# Patient Record
Sex: FEMALE | Race: ASIAN | NOT HISPANIC OR LATINO
[De-identification: names, ages, dates, MRNs, and addresses within clinical notes are randomized per-mention and may not be internally consistent; named-entity substitution may affect disease eponyms.]

---

## 2018-01-19 ENCOUNTER — RESULT REVIEW (OUTPATIENT)
Age: 33
End: 2018-01-19

## 2018-01-19 ENCOUNTER — OUTPATIENT (OUTPATIENT)
Dept: OUTPATIENT SERVICES | Facility: HOSPITAL | Age: 33
LOS: 1 days | End: 2018-01-19
Payer: COMMERCIAL

## 2018-01-19 DIAGNOSIS — K61.0 ANAL ABSCESS: ICD-10-CM

## 2018-01-19 DIAGNOSIS — K60.3 ANAL FISTULA: ICD-10-CM

## 2018-01-19 PROCEDURE — 88305 TISSUE EXAM BY PATHOLOGIST: CPT

## 2018-01-19 PROCEDURE — 45380 COLONOSCOPY AND BIOPSY: CPT

## 2018-01-19 PROCEDURE — 88305 TISSUE EXAM BY PATHOLOGIST: CPT | Mod: 26

## 2018-01-22 ENCOUNTER — TRANSCRIPTION ENCOUNTER (OUTPATIENT)
Age: 33
End: 2018-01-22

## 2018-02-05 ENCOUNTER — OUTPATIENT (OUTPATIENT)
Dept: OUTPATIENT SERVICES | Facility: HOSPITAL | Age: 33
LOS: 1 days | End: 2018-02-05
Payer: COMMERCIAL

## 2018-02-05 VITALS
HEIGHT: 57 IN | HEART RATE: 85 BPM | SYSTOLIC BLOOD PRESSURE: 116 MMHG | RESPIRATION RATE: 16 BRPM | DIASTOLIC BLOOD PRESSURE: 84 MMHG | WEIGHT: 136.03 LBS | TEMPERATURE: 98 F

## 2018-02-05 DIAGNOSIS — Z01.818 ENCOUNTER FOR OTHER PREPROCEDURAL EXAMINATION: ICD-10-CM

## 2018-02-05 DIAGNOSIS — K61.0 ANAL ABSCESS: ICD-10-CM

## 2018-02-05 DIAGNOSIS — K60.3 ANAL FISTULA: ICD-10-CM

## 2018-02-05 DIAGNOSIS — Z87.59 PERSONAL HISTORY OF OTHER COMPLICATIONS OF PREGNANCY, CHILDBIRTH AND THE PUERPERIUM: Chronic | ICD-10-CM

## 2018-02-05 DIAGNOSIS — Z98.51 TUBAL LIGATION STATUS: Chronic | ICD-10-CM

## 2018-02-05 LAB
HCG SERPL-ACNC: <1 MIU/ML — SIGNIFICANT CHANGE UP
HCT VFR BLD CALC: 38.2 % — SIGNIFICANT CHANGE UP (ref 34.5–45)
HGB BLD-MCNC: 13.1 G/DL — SIGNIFICANT CHANGE UP (ref 11.5–15.5)
MCHC RBC-ENTMCNC: 28.8 PG — SIGNIFICANT CHANGE UP (ref 27–34)
MCHC RBC-ENTMCNC: 34.3 GM/DL — SIGNIFICANT CHANGE UP (ref 32–36)
MCV RBC AUTO: 84 FL — SIGNIFICANT CHANGE UP (ref 80–100)
PLATELET # BLD AUTO: 321 K/UL — SIGNIFICANT CHANGE UP (ref 150–400)
RBC # BLD: 4.55 M/UL — SIGNIFICANT CHANGE UP (ref 3.8–5.2)
RBC # FLD: 11.5 % — SIGNIFICANT CHANGE UP (ref 10.3–14.5)
WBC # BLD: 8.1 K/UL — SIGNIFICANT CHANGE UP (ref 3.8–10.5)
WBC # FLD AUTO: 8.1 K/UL — SIGNIFICANT CHANGE UP (ref 3.8–10.5)

## 2018-02-05 PROCEDURE — 36415 COLL VENOUS BLD VENIPUNCTURE: CPT

## 2018-02-05 PROCEDURE — 84702 CHORIONIC GONADOTROPIN TEST: CPT

## 2018-02-05 PROCEDURE — G0463: CPT

## 2018-02-05 PROCEDURE — 85027 COMPLETE CBC AUTOMATED: CPT

## 2018-02-05 NOTE — H&P PST ADULT - NSANTHOSAYNRD_GEN_A_CORE
No. RICK screening performed.  STOP BANG Legend: 0-2 = LOW Risk; 3-4 = INTERMEDIATE Risk; 5-8 = HIGH Risk

## 2018-02-05 NOTE — H&P PST ADULT - HISTORY OF PRESENT ILLNESS
33 yo female presents to [facility] scheduled for an exam under anesthesia - fistulotomy on 2/9 with Dr. Monte. Reports H/O a draining fistula Denies current infection.

## 2018-02-05 NOTE — H&P PST ADULT - ASSESSMENT
31 yo female with an anal fistula scheduled for an exam under anesthesia - fistulotomy on 2/9 with Dr. Monte

## 2018-02-05 NOTE — H&P PST ADULT - PROBLEM SELECTOR PLAN 2
Labs - CBC, HCG  No MC needed  Pre op instructions reviewed and given. Avoid NSAIDs and OTC supplements. Verbalized understanding

## 2018-02-08 RX ORDER — SODIUM CHLORIDE 9 MG/ML
3 INJECTION INTRAMUSCULAR; INTRAVENOUS; SUBCUTANEOUS ONCE
Qty: 0 | Refills: 0 | Status: DISCONTINUED | OUTPATIENT
Start: 2018-02-09 | End: 2018-02-24

## 2018-02-08 RX ORDER — SODIUM CHLORIDE 9 MG/ML
1000 INJECTION, SOLUTION INTRAVENOUS
Qty: 0 | Refills: 0 | Status: DISCONTINUED | OUTPATIENT
Start: 2018-02-09 | End: 2018-02-09

## 2018-02-09 ENCOUNTER — RESULT REVIEW (OUTPATIENT)
Age: 33
End: 2018-02-09

## 2018-02-09 ENCOUNTER — OUTPATIENT (OUTPATIENT)
Dept: OUTPATIENT SERVICES | Facility: HOSPITAL | Age: 33
LOS: 1 days | End: 2018-02-09
Payer: COMMERCIAL

## 2018-02-09 ENCOUNTER — TRANSCRIPTION ENCOUNTER (OUTPATIENT)
Age: 33
End: 2018-02-09

## 2018-02-09 VITALS
SYSTOLIC BLOOD PRESSURE: 109 MMHG | RESPIRATION RATE: 16 BRPM | OXYGEN SATURATION: 98 % | DIASTOLIC BLOOD PRESSURE: 73 MMHG | HEART RATE: 74 BPM | TEMPERATURE: 98 F

## 2018-02-09 VITALS
OXYGEN SATURATION: 97 % | RESPIRATION RATE: 16 BRPM | HEART RATE: 71 BPM | DIASTOLIC BLOOD PRESSURE: 79 MMHG | SYSTOLIC BLOOD PRESSURE: 117 MMHG

## 2018-02-09 DIAGNOSIS — Z98.51 TUBAL LIGATION STATUS: Chronic | ICD-10-CM

## 2018-02-09 DIAGNOSIS — Z87.59 PERSONAL HISTORY OF OTHER COMPLICATIONS OF PREGNANCY, CHILDBIRTH AND THE PUERPERIUM: Chronic | ICD-10-CM

## 2018-02-09 DIAGNOSIS — K60.3 ANAL FISTULA: ICD-10-CM

## 2018-02-09 PROCEDURE — 46200 REMOVAL OF ANAL FISSURE: CPT

## 2018-02-09 PROCEDURE — 88304 TISSUE EXAM BY PATHOLOGIST: CPT | Mod: 26

## 2018-02-09 PROCEDURE — C1889: CPT

## 2018-02-09 PROCEDURE — 88304 TISSUE EXAM BY PATHOLOGIST: CPT

## 2018-02-09 RX ORDER — HYDROMORPHONE HYDROCHLORIDE 2 MG/ML
0.5 INJECTION INTRAMUSCULAR; INTRAVENOUS; SUBCUTANEOUS
Qty: 0 | Refills: 0 | Status: DISCONTINUED | OUTPATIENT
Start: 2018-02-09 | End: 2018-02-09

## 2018-02-09 RX ORDER — OXYCODONE AND ACETAMINOPHEN 5; 325 MG/1; MG/1
1 TABLET ORAL EVERY 4 HOURS
Qty: 0 | Refills: 0 | Status: DISCONTINUED | OUTPATIENT
Start: 2018-02-09 | End: 2018-02-09

## 2018-02-09 RX ORDER — BUPIVACAINE 13.3 MG/ML
20 INJECTION, SUSPENSION, LIPOSOMAL INFILTRATION ONCE
Qty: 0 | Refills: 0 | Status: DISCONTINUED | OUTPATIENT
Start: 2018-02-09 | End: 2018-02-24

## 2018-02-09 RX ORDER — SODIUM CHLORIDE 9 MG/ML
1000 INJECTION, SOLUTION INTRAVENOUS
Qty: 0 | Refills: 0 | Status: DISCONTINUED | OUTPATIENT
Start: 2018-02-09 | End: 2018-02-09

## 2018-02-09 RX ORDER — ONDANSETRON 8 MG/1
4 TABLET, FILM COATED ORAL ONCE
Qty: 0 | Refills: 0 | Status: DISCONTINUED | OUTPATIENT
Start: 2018-02-09 | End: 2018-02-09

## 2018-02-09 RX ORDER — SODIUM CHLORIDE 9 MG/ML
300 INJECTION, SOLUTION INTRAVENOUS ONCE
Qty: 0 | Refills: 0 | Status: COMPLETED | OUTPATIENT
Start: 2018-02-09 | End: 2018-02-09

## 2018-02-09 RX ORDER — METRONIDAZOLE 500 MG
500 TABLET ORAL ONCE
Qty: 0 | Refills: 0 | Status: DISCONTINUED | OUTPATIENT
Start: 2018-02-09 | End: 2018-02-24

## 2018-02-09 RX ADMIN — SODIUM CHLORIDE 600 MILLILITER(S): 9 INJECTION, SOLUTION INTRAVENOUS at 08:48

## 2018-02-09 RX ADMIN — SODIUM CHLORIDE 30 MILLILITER(S): 9 INJECTION, SOLUTION INTRAVENOUS at 06:51

## 2018-02-09 NOTE — ASU PATIENT PROFILE, ADULT - BILL OF RIGHTS/ADMISSION INFORMATION PROVIDED TO:
Patient 22 yo F c/o I+D 3d ago, then stayed overnight in [facility]U for IV abx, then D/C'd and now returns for packing removal. Denies fever/chills, much improved pain, no N/V, no hx of recurrent abscess,

## 2018-02-09 NOTE — ASU DISCHARGE PLAN (ADULT/PEDIATRIC). - NOTIFY
Bleeding that does not stop/Unable to Urinate/Pain not relieved by Medications/Fever greater than 101

## 2018-02-09 NOTE — BRIEF OPERATIVE NOTE - PROCEDURE
<<-----Click on this checkbox to enter Procedure Fissurectomy, anal  02/09/2018    Active  RAJIV  Fistulotomy, anal  02/09/2018    Active  RAJIV

## 2023-08-13 NOTE — ASU DISCHARGE PLAN (ADULT/PEDIATRIC). - MEDICATION SUMMARY - MEDICATIONS TO STOP TAKING
I will STOP taking the medications listed below when I get home from the hospital:  None
palpitations

## 2024-02-28 NOTE — H&P PST ADULT - TOBACCO USE
Received voicemail from patient regarding insurance and patient wanting to speak with someone to discuss the insurance. Spoke with Darlene, manager, advising patient to call [employer] department. Called to update patient and to direct patient to call [employer].    Never smoker